# Patient Record
Sex: FEMALE | Race: WHITE | NOT HISPANIC OR LATINO
[De-identification: names, ages, dates, MRNs, and addresses within clinical notes are randomized per-mention and may not be internally consistent; named-entity substitution may affect disease eponyms.]

---

## 2017-01-12 ENCOUNTER — APPOINTMENT (OUTPATIENT)
Dept: CARDIOLOGY | Facility: CLINIC | Age: 62
End: 2017-01-12

## 2017-02-09 ENCOUNTER — APPOINTMENT (OUTPATIENT)
Dept: CARDIOLOGY | Facility: CLINIC | Age: 62
End: 2017-02-09

## 2017-03-09 ENCOUNTER — APPOINTMENT (OUTPATIENT)
Dept: CARDIOLOGY | Facility: CLINIC | Age: 62
End: 2017-03-09

## 2017-03-17 ENCOUNTER — APPOINTMENT (OUTPATIENT)
Dept: CARDIOLOGY | Facility: CLINIC | Age: 62
End: 2017-03-17

## 2017-04-12 ENCOUNTER — APPOINTMENT (OUTPATIENT)
Dept: CARDIOLOGY | Facility: CLINIC | Age: 62
End: 2017-04-12

## 2018-02-28 ENCOUNTER — RECORD ABSTRACTING (OUTPATIENT)
Age: 63
End: 2018-02-28

## 2018-02-28 ENCOUNTER — NON-APPOINTMENT (OUTPATIENT)
Age: 63
End: 2018-02-28

## 2018-02-28 ENCOUNTER — APPOINTMENT (OUTPATIENT)
Dept: CARDIOLOGY | Facility: CLINIC | Age: 63
End: 2018-02-28
Payer: COMMERCIAL

## 2018-02-28 VITALS — WEIGHT: 201 LBS | SYSTOLIC BLOOD PRESSURE: 110 MMHG | HEART RATE: 53 BPM | DIASTOLIC BLOOD PRESSURE: 62 MMHG

## 2018-02-28 DIAGNOSIS — Z80.0 FAMILY HISTORY OF MALIGNANT NEOPLASM OF DIGESTIVE ORGANS: ICD-10-CM

## 2018-02-28 DIAGNOSIS — Z86.79 PERSONAL HISTORY OF OTHER DISEASES OF THE CIRCULATORY SYSTEM: ICD-10-CM

## 2018-02-28 DIAGNOSIS — E66.3 OVERWEIGHT: ICD-10-CM

## 2018-02-28 DIAGNOSIS — R00.2 PALPITATIONS: ICD-10-CM

## 2018-02-28 DIAGNOSIS — Z82.49 FAMILY HISTORY OF ISCHEMIC HEART DISEASE AND OTHER DISEASES OF THE CIRCULATORY SYSTEM: ICD-10-CM

## 2018-02-28 DIAGNOSIS — Z87.19 PERSONAL HISTORY OF OTHER DISEASES OF THE DIGESTIVE SYSTEM: ICD-10-CM

## 2018-02-28 DIAGNOSIS — Z87.898 PERSONAL HISTORY OF OTHER SPECIFIED CONDITIONS: ICD-10-CM

## 2018-02-28 DIAGNOSIS — R53.83 OTHER FATIGUE: ICD-10-CM

## 2018-02-28 DIAGNOSIS — R73.03 PREDIABETES.: ICD-10-CM

## 2018-02-28 DIAGNOSIS — Z86.39 PERSONAL HISTORY OF OTHER ENDOCRINE, NUTRITIONAL AND METABOLIC DISEASE: ICD-10-CM

## 2018-02-28 PROCEDURE — 93000 ELECTROCARDIOGRAM COMPLETE: CPT

## 2018-02-28 PROCEDURE — 99214 OFFICE O/P EST MOD 30 MIN: CPT

## 2018-02-28 RX ORDER — OMEGA-3-ACID ETHYL ESTERS 1 G/1
1 CAPSULE, LIQUID FILLED ORAL
Refills: 0 | Status: DISCONTINUED | COMMUNITY
End: 2018-02-28

## 2018-02-28 RX ORDER — CYANOCOBALAMIN (VITAMIN B-12) 2500 MCG
2500 TABLET, SUBLINGUAL SUBLINGUAL
Refills: 0 | Status: ACTIVE | COMMUNITY

## 2018-02-28 RX ORDER — DICLOFENAC SODIUM 10 MG/G
1 GEL TOPICAL
Refills: 0 | Status: DISCONTINUED | COMMUNITY
End: 2018-02-28

## 2018-02-28 RX ORDER — CHROMIUM 200 MCG
TABLET ORAL
Refills: 0 | Status: ACTIVE | COMMUNITY

## 2018-02-28 RX ORDER — FLUTICASONE PROPIONATE 50 MCG
50 SPRAY, SUSPENSION NASAL TWICE DAILY
Refills: 0 | Status: ACTIVE | COMMUNITY

## 2018-02-28 RX ORDER — OMEPRAZOLE 40 MG/1
40 CAPSULE, DELAYED RELEASE ORAL
Refills: 0 | Status: ACTIVE | COMMUNITY

## 2018-02-28 RX ORDER — ROPINIROLE 0.25 MG/1
0.25 TABLET, FILM COATED ORAL
Refills: 0 | Status: ACTIVE | COMMUNITY

## 2018-03-09 ENCOUNTER — APPOINTMENT (OUTPATIENT)
Dept: CARDIOLOGY | Facility: CLINIC | Age: 63
End: 2018-03-09
Payer: COMMERCIAL

## 2018-03-09 PROCEDURE — 93306 TTE W/DOPPLER COMPLETE: CPT

## 2018-03-12 ENCOUNTER — APPOINTMENT (OUTPATIENT)
Dept: CARDIOLOGY | Facility: CLINIC | Age: 63
End: 2018-03-12

## 2018-03-13 ENCOUNTER — RESULT REVIEW (OUTPATIENT)
Age: 63
End: 2018-03-13

## 2018-06-06 ENCOUNTER — APPOINTMENT (OUTPATIENT)
Dept: CARDIOLOGY | Facility: CLINIC | Age: 63
End: 2018-06-06
Payer: COMMERCIAL

## 2018-06-06 VITALS
SYSTOLIC BLOOD PRESSURE: 110 MMHG | HEART RATE: 62 BPM | WEIGHT: 200 LBS | HEIGHT: 64.5 IN | BODY MASS INDEX: 33.73 KG/M2 | DIASTOLIC BLOOD PRESSURE: 60 MMHG

## 2018-06-06 DIAGNOSIS — Z00.00 ENCOUNTER FOR GENERAL ADULT MEDICAL EXAMINATION W/OUT ABNORMAL FINDINGS: ICD-10-CM

## 2018-06-06 DIAGNOSIS — G47.30 SLEEP APNEA, UNSPECIFIED: ICD-10-CM

## 2018-06-06 DIAGNOSIS — R73.01 IMPAIRED FASTING GLUCOSE: ICD-10-CM

## 2018-06-06 DIAGNOSIS — I35.1 NONRHEUMATIC AORTIC (VALVE) INSUFFICIENCY: ICD-10-CM

## 2018-06-06 DIAGNOSIS — R94.31 ABNORMAL ELECTROCARDIOGRAM [ECG] [EKG]: ICD-10-CM

## 2018-06-06 PROCEDURE — 99214 OFFICE O/P EST MOD 30 MIN: CPT

## 2018-07-13 ENCOUNTER — RX RENEWAL (OUTPATIENT)
Age: 63
End: 2018-07-13

## 2018-12-02 ENCOUNTER — RX RENEWAL (OUTPATIENT)
Age: 63
End: 2018-12-02

## 2019-02-01 ENCOUNTER — NON-APPOINTMENT (OUTPATIENT)
Age: 64
End: 2019-02-01

## 2019-02-01 ENCOUNTER — TRANSCRIPTION ENCOUNTER (OUTPATIENT)
Age: 64
End: 2019-02-01

## 2019-02-01 ENCOUNTER — APPOINTMENT (OUTPATIENT)
Dept: CARDIOLOGY | Facility: CLINIC | Age: 64
End: 2019-02-01
Payer: MEDICARE

## 2019-02-01 VITALS
SYSTOLIC BLOOD PRESSURE: 100 MMHG | BODY MASS INDEX: 33.73 KG/M2 | HEART RATE: 54 BPM | WEIGHT: 200 LBS | OXYGEN SATURATION: 96 % | HEIGHT: 64.5 IN | DIASTOLIC BLOOD PRESSURE: 54 MMHG

## 2019-02-01 DIAGNOSIS — R94.31 ABNORMAL ELECTROCARDIOGRAM [ECG] [EKG]: ICD-10-CM

## 2019-02-01 DIAGNOSIS — Z01.810 ENCOUNTER FOR PREPROCEDURAL CARDIOVASCULAR EXAMINATION: ICD-10-CM

## 2019-02-01 DIAGNOSIS — M76.829 POSTERIOR TIBIAL TENDINITIS, UNSPECIFIED LEG: ICD-10-CM

## 2019-02-01 DIAGNOSIS — Z82.49 FAMILY HISTORY OF ISCHEMIC HEART DISEASE AND OTHER DISEASES OF THE CIRCULATORY SYSTEM: ICD-10-CM

## 2019-02-01 PROCEDURE — 99214 OFFICE O/P EST MOD 30 MIN: CPT

## 2019-02-01 PROCEDURE — 93000 ELECTROCARDIOGRAM COMPLETE: CPT

## 2019-02-01 RX ORDER — OMEGA-3/DHA/EPA/FISH OIL 300-1000MG
CAPSULE ORAL
Refills: 0 | Status: ACTIVE | COMMUNITY

## 2019-02-01 RX ORDER — IRON/IRON ASP GLY/FA/MV-MIN 38 125-25-1MG
TABLET ORAL
Refills: 0 | Status: ACTIVE | COMMUNITY

## 2019-02-02 PROBLEM — R94.31 ABNORMAL EKG: Status: ACTIVE | Noted: 2019-02-02

## 2019-02-02 PROBLEM — Z82.49 FAMILY HISTORY OF MYOCARDIAL INFARCTION: Status: ACTIVE | Noted: 2018-02-28

## 2019-02-02 PROBLEM — Z01.810 PREOP CARDIOVASCULAR EXAM: Status: ACTIVE | Noted: 2019-02-02

## 2019-02-02 NOTE — HISTORY OF PRESENT ILLNESS
[FreeTextEntry1] : ESMER MALHOTRA  is a 63 year old  F\par  \par family history of accelerated atherosclerosis, hyperlipidemia, VHD, subclinical atherosclerosis, vasovagal syncope and hypertension who returns to the office for clinical follow up. \par  \par There is no prior history of myocardial infarction, coronary revascularization, rheumatic heart disease, congestive heart failure, symptomatic arrhythmias including atrial fibrillation. \par \par There have been no further episodes of lightheadedness, dizziness, or syncope. \par No chest discomfort. \par No regular exercise. \par \par Major complaint is related to knee pain. \par She underwent an MRI and told that she requires arthroscopy.\par She is working with physical therapy\par Has difficulty with her balance.\par \par Now retired. Plans to move to [place]\par \par Echocardiogram March 2018. There is normal left ventricular function. 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 There is borderline aortic stenosis with mild aortic regurgitation and a dilated aorta of 4.2 cm. There is mild left atrial enlargement. There are normal pulmonary pressures. \par \par Bloodwork January 2018. Potassium 4.1, creatinine 1.1, LDL 75, C-reactive protein 3.5. \par May 2018. Glucose 102, creatinine 1.0, HDL 52, LDL 82, triglycerides 187, TSH 1.9, , hemoglobin 13.0. \par \par Abdominal ultrasound from December 2015 has mild plaque. No aneurysm. \par \par Carotid Doppler outside office January 2013: Minimal plaque at the right carotid bifurcation and origin of the right ICA. No significant stenosis.\par \par Stress echocardiogram February '13, 8 minutes 48 seconds, 12.9 METs, heart rate 140, nonspecific EKG changes, normal augmentation of LV function and wall motion and negative for exercise-induced ischemia. \par \par Holter monitor '13, sinus rhythm, heart rate 56 to 116, average rate of 74, APCs, sequentials, no PVCs, and no sustained dysrhythmias. \par \par EKG today demonstrates sinus bradycardia with first degree AV block, right bundle branch block, and possible inferolateral MI\par

## 2019-02-02 NOTE — ASSESSMENT
[FreeTextEntry1] : Preoperative status [knee scope] \par 02/01/2019 \par At present, there are no active cardiac conditions. \par No recent unstable coronary syndromes, decompensated heart failure, severe valvular heart disease or significant dysrhythmias.  \par Baseline functional status is acceptable.    \par The clinical benefit of the proposed procedure outweighs the associated cardiovascular risk.  \par Risk not attenuated with further CV testing.  \par Prior testing as outlined above.\par Optimized from a cardiovascular perspective.\par \par VHD\par Aortic dilatation. \par Monitor valvular heart disease, aortic dimensions and LV function.\par Follwo up echo\par Does not require SBE prophylaxis. \par \par HTN\par Improved on combination rx\par Less palpitations on beta blocker, intol of higher doses\par No CHF or CRI. \par Low sodium diet\par Rx XANDER\par \par Atherosclerosis, arota\par +Fhx\par Hyperlipidemia. Note CRP elevated\par Labs requested\par Continue Crestor. \par Adjunctive dietary measures. \par Reduce carbohydrate in diet.\par \par Abnormal EKG.   \par Prior noninvasive testing as outlined above\par \par Reviewed red flag symptoms such as change in exercise tolerance or exertional symptoms which would warrant urgent reevaluation \par \par Medications have been refilled. \par

## 2019-02-02 NOTE — PHYSICAL EXAM
[General Appearance - Well Developed] : well developed [Normal Appearance] : normal appearance [Well Groomed] : well groomed [General Appearance - Well Nourished] : well nourished [No Deformities] : no deformities [General Appearance - In No Acute Distress] : no acute distress [Normal Conjunctiva] : the conjunctiva exhibited no abnormalities [Eyelids - No Xanthelasma] : the eyelids demonstrated no xanthelasmas [Normal Oral Mucosa] : normal oral mucosa [No Oral Pallor] : no oral pallor [No Oral Cyanosis] : no oral cyanosis [Normal Jugular Venous A Waves Present] : normal jugular venous A waves present [Normal Jugular Venous V Waves Present] : normal jugular venous V waves present [No Jugular Venous Nelson A Waves] : no jugular venous nelson A waves [Respiration, Rhythm And Depth] : normal respiratory rhythm and effort [Exaggerated Use Of Accessory Muscles For Inspiration] : no accessory muscle use [Auscultation Breath Sounds / Voice Sounds] : lungs were clear to auscultation bilaterally [Heart Rate And Rhythm] : heart rate and rhythm were normal [Heart Sounds] : normal S1 and S2 [Abdomen Soft] : soft [Abdomen Tenderness] : non-tender [Abdomen Mass (___ Cm)] : no abdominal mass palpated [Abnormal Walk] : normal gait [Gait - Sufficient For Exercise Testing] : the gait was sufficient for exercise testing [Nail Clubbing] : no clubbing of the fingernails [Cyanosis, Localized] : no localized cyanosis [Petechial Hemorrhages (___cm)] : no petechial hemorrhages [Skin Color & Pigmentation] : normal skin color and pigmentation [] : no rash [No Venous Stasis] : no venous stasis [Skin Lesions] : no skin lesions [No Skin Ulcers] : no skin ulcer [No Xanthoma] : no  xanthoma was observed [Oriented To Time, Place, And Person] : oriented to person, place, and time [Affect] : the affect was normal [Mood] : the mood was normal [No Anxiety] : not feeling anxious [FreeTextEntry1] : [employer]

## 2019-03-21 ENCOUNTER — APPOINTMENT (OUTPATIENT)
Dept: CARDIOLOGY | Facility: CLINIC | Age: 64
End: 2019-03-21
Payer: MEDICARE

## 2019-03-21 PROCEDURE — 93306 TTE W/DOPPLER COMPLETE: CPT

## 2019-03-28 ENCOUNTER — APPOINTMENT (OUTPATIENT)
Dept: CARDIOLOGY | Facility: CLINIC | Age: 64
End: 2019-03-28
Payer: MEDICARE

## 2019-03-28 VITALS
WEIGHT: 200 LBS | DIASTOLIC BLOOD PRESSURE: 68 MMHG | HEART RATE: 55 BPM | HEIGHT: 64 IN | OXYGEN SATURATION: 96 % | SYSTOLIC BLOOD PRESSURE: 122 MMHG | BODY MASS INDEX: 34.15 KG/M2

## 2019-04-16 ENCOUNTER — APPOINTMENT (OUTPATIENT)
Dept: CARDIOLOGY | Facility: CLINIC | Age: 64
End: 2019-04-16
Payer: MEDICARE

## 2019-04-16 VITALS
HEART RATE: 54 BPM | WEIGHT: 206 LBS | SYSTOLIC BLOOD PRESSURE: 138 MMHG | BODY MASS INDEX: 35.17 KG/M2 | DIASTOLIC BLOOD PRESSURE: 82 MMHG | HEIGHT: 64 IN

## 2019-04-16 DIAGNOSIS — E78.5 HYPERLIPIDEMIA, UNSPECIFIED: ICD-10-CM

## 2019-04-16 DIAGNOSIS — I45.10 UNSPECIFIED RIGHT BUNDLE-BRANCH BLOCK: ICD-10-CM

## 2019-04-16 DIAGNOSIS — I35.2 NONRHEUMATIC AORTIC (VALVE) STENOSIS WITH INSUFFICIENCY: ICD-10-CM

## 2019-04-16 DIAGNOSIS — M06.9 RHEUMATOID ARTHRITIS, UNSPECIFIED: ICD-10-CM

## 2019-04-16 DIAGNOSIS — I71.2 THORACIC AORTIC ANEURYSM, W/OUT RUPTURE: ICD-10-CM

## 2019-04-16 DIAGNOSIS — I70.0 ATHEROSCLEROSIS OF AORTA: ICD-10-CM

## 2019-04-16 DIAGNOSIS — I10 ESSENTIAL (PRIMARY) HYPERTENSION: ICD-10-CM

## 2019-04-16 PROCEDURE — 99214 OFFICE O/P EST MOD 30 MIN: CPT

## 2019-04-16 RX ORDER — PREDNISONE 10 MG
TABLET ORAL DAILY
Refills: 0 | Status: ACTIVE | COMMUNITY

## 2019-04-16 NOTE — REASON FOR VISIT
[Follow-Up - Clinic] : a clinic follow-up of [Abnormal ECG] : an abnormal ECG [Hyperlipidemia] : hyperlipidemia [Medication Management] : Medication management [Hypertension] : hypertension

## 2019-04-18 NOTE — ASSESSMENT
[FreeTextEntry1] : \par VHD\par Aortic dilatation. \par Monitor valvular heart disease, aortic dimensions and LV function.\par Reviewed echo\par Does not require SBE prophylaxis. \par \par HTN\par Improved on combination rx\par Less palpitations on beta blocker, intol of higher doses\par No CHF or CRI. \par Low sodium diet\par Rx XANDER\par Avoid overstringent targets with h/o syncope\par \par Atherosclerosis, arota\par +Fhx\par Hyperlipidemia. Note CRP elevated\par Continue Crestor. \par Adjunctive dietary measures. \par Reduce carbohydrate in diet.\par \par Abnormal EKG. \par Prior noninvasive testing as outlined above\par \par Reviewed red flag symptoms such as change in exercise tolerance or exertional symptoms which would warrant urgent reevaluation \par copy of recent testing has been provided for continuity of care. \par Emphasized importance of hydration while traveling

## 2019-04-18 NOTE — HISTORY OF PRESENT ILLNESS
[FreeTextEntry1] : ESMER MALHOTRA  is a 64 year old  F\par  family history of accelerated atherosclerosis, hyperlipidemia, VHD, subclinical atherosclerosis, vasovagal syncope and hypertension who returns to the office for clinical follow up. \par  \par There is no prior history of myocardial infarction, coronary revascularization, rheumatic heart disease, congestive heart failure, symptomatic arrhythmias including atrial fibrillation. \par \par There have been no further episodes of syncope. \par No chest discomfort. \par No regular exercise. \par Now retired. Plans to move to [place]\par She was diagnosed with rheumatoid arthritis and now on steroids\par She did have an episode where she awoke, felt unwell and warm.\par \par Echocardiogram March 2019. Normal left ventricular function. Mild aortic regurgitation, ascending aorta 4.1 cm. Mild left atrial enlargement. \par Blood work, March 2019. LDL 76, total cholesterol 176, HDL 57, potassium 4.3, creatinine 0.9, CPK 90\par \par Abdominal ultrasound from December 2015 has mild plaque. No aneurysm. \par \par Carotid Doppler outside office January 2013: Minimal plaque at the right carotid bifurcation and origin of the right ICA. No significant stenosis.\par \par Stress echocardiogram February '13, 8 minutes 48 seconds, 12.9 METs, heart rate 140, nonspecific EKG changes, normal augmentation of LV function and wall motion and negative for exercise-induced ischemia. \par \par Holter monitor '13, sinus rhythm, heart rate 56 to 116, average rate of 74, APCs, sequentials, no PVCs, and no sustained dysrhythmias. \par \par EKG demonstrates sinus bradycardia with first degree AV block, right bundle branch block, and possible inferolateral MI\par \par \par \par

## 2019-04-18 NOTE — PHYSICAL EXAM
[General Appearance - Well Developed] : well developed [Normal Appearance] : normal appearance [Well Groomed] : well groomed [General Appearance - Well Nourished] : well nourished [No Deformities] : no deformities [General Appearance - In No Acute Distress] : no acute distress [Normal Conjunctiva] : the conjunctiva exhibited no abnormalities [Eyelids - No Xanthelasma] : the eyelids demonstrated no xanthelasmas [Normal Oral Mucosa] : normal oral mucosa [No Oral Pallor] : no oral pallor [No Oral Cyanosis] : no oral cyanosis [Normal Jugular Venous A Waves Present] : normal jugular venous A waves present [Normal Jugular Venous V Waves Present] : normal jugular venous V waves present [No Jugular Venous Nelson A Waves] : no jugular venous nelson A waves [Respiration, Rhythm And Depth] : normal respiratory rhythm and effort [Exaggerated Use Of Accessory Muscles For Inspiration] : no accessory muscle use [Auscultation Breath Sounds / Voice Sounds] : lungs were clear to auscultation bilaterally [Heart Rate And Rhythm] : heart rate and rhythm were normal [Heart Sounds] : normal S1 and S2 [Abdomen Soft] : soft [Abdomen Tenderness] : non-tender [Abdomen Mass (___ Cm)] : no abdominal mass palpated [Abnormal Walk] : normal gait [Gait - Sufficient For Exercise Testing] : the gait was sufficient for exercise testing [Nail Clubbing] : no clubbing of the fingernails [Cyanosis, Localized] : no localized cyanosis [Petechial Hemorrhages (___cm)] : no petechial hemorrhages [Skin Color & Pigmentation] : normal skin color and pigmentation [No Venous Stasis] : no venous stasis [] : no rash [No Skin Ulcers] : no skin ulcer [Skin Lesions] : no skin lesions [Oriented To Time, Place, And Person] : oriented to person, place, and time [No Xanthoma] : no  xanthoma was observed [Affect] : the affect was normal [Mood] : the mood was normal [No Anxiety] : not feeling anxious [FreeTextEntry1] : [employer]

## 2019-07-15 ENCOUNTER — MEDICATION RENEWAL (OUTPATIENT)
Age: 64
End: 2019-07-15

## 2019-07-15 ENCOUNTER — RX RENEWAL (OUTPATIENT)
Age: 64
End: 2019-07-15

## 2019-10-14 ENCOUNTER — MEDICATION RENEWAL (OUTPATIENT)
Age: 64
End: 2019-10-14

## 2019-10-14 RX ORDER — METOPROLOL SUCCINATE 25 MG/1
25 TABLET, EXTENDED RELEASE ORAL
Qty: 90 | Refills: 2 | Status: ACTIVE | COMMUNITY
Start: 2018-12-02 | End: 1900-01-01

## 2020-01-14 RX ORDER — AMLODIPINE BESYLATE 5 MG/1
5 TABLET ORAL
Qty: 90 | Refills: 1 | Status: ACTIVE | COMMUNITY
Start: 2018-12-02 | End: 1900-01-01

## 2020-01-14 RX ORDER — ROSUVASTATIN CALCIUM 10 MG/1
10 TABLET, FILM COATED ORAL
Qty: 90 | Refills: 1 | Status: ACTIVE | COMMUNITY
Start: 2018-07-13 | End: 1900-01-01

## 2021-12-06 ENCOUNTER — TRANSCRIPTION ENCOUNTER (OUTPATIENT)
Age: 66
End: 2021-12-06

## 2024-02-21 NOTE — CARDIOLOGY SUMMARY
[___] : [unfilled]
General Sunscreen Counseling: I recommended a broad spectrum sunscreen with a SPF of 30 or higher.  I explained that SPF 30 sunscreens block approximately 97 percent of the sun's harmful rays.  Sunscreens should be applied at least 15 minutes prior to expected sun exposure and then every 2 hours after that as long as sun exposure continues. If swimming or exercising sunscreen should be reapplied every 45 minutes to an hour after getting wet or sweating.  One ounce, or the equivalent of a shot glass full of sunscreen, is adequate to protect the skin not covered by a bathing suit. I also recommended a lip balm with a sunscreen as well. Sun protective clothing can be used in lieu of sunscreen but must be worn the entire time you are exposed to the sun's rays.
Detail Level: Detailed